# Patient Record
Sex: MALE | Race: OTHER
[De-identification: names, ages, dates, MRNs, and addresses within clinical notes are randomized per-mention and may not be internally consistent; named-entity substitution may affect disease eponyms.]

---

## 2019-03-06 ENCOUNTER — HOSPITAL ENCOUNTER (OUTPATIENT)
Dept: HOSPITAL 89 - RAD | Age: 49
End: 2019-03-06
Attending: FAMILY MEDICINE
Payer: COMMERCIAL

## 2019-03-06 DIAGNOSIS — R10.817: Primary | ICD-10-CM

## 2019-03-06 PROCEDURE — 74018 RADEX ABDOMEN 1 VIEW: CPT

## 2019-04-11 ENCOUNTER — HOSPITAL ENCOUNTER (OUTPATIENT)
Dept: HOSPITAL 89 - CT | Age: 49
End: 2019-04-11
Attending: FAMILY MEDICINE
Payer: COMMERCIAL

## 2019-04-11 DIAGNOSIS — R10.31: ICD-10-CM

## 2019-04-11 DIAGNOSIS — K57.50: Primary | ICD-10-CM

## 2019-04-11 PROCEDURE — 74177 CT ABD & PELVIS W/CONTRAST: CPT

## 2019-04-11 NOTE — RADIOLOGY IMAGING REPORT
FACILITY: [facility] 

 

PATIENT NAME: Andre Julian

: 1970

MR: [id number]

V: [id number]

EXAM DATE: 

ORDERING PHYSICIAN: EVANGELISTA JUAREZ

TECHNOLOGIST: 

 

Location: [facility]

Patient: Andre Julian

: 1970

MRN: [id number]

Visit/Account:[id number]

Date of Sevice:  2019

 

ACCESSION #: [id number]

 

CT ABDOMEN PELVIS W/ CON

 

HISTORY:  Right lower quadrant pain

 

TECHNIQUE: Following administration of IV contrast contiguous axial images acquired through the abdom
en/pelvis.  Coronal and sagittal reformatting also performed.Dose Lowering Technique

 

One of the following dose optimization techniques was utilized in the performance of this exam: Autom
ated exposure control; adjustment of the mA and/or kV according to the patient's size; or use of an i
terative  reconstruction technique.  Specific details can be referenced in the facility's radiology C
T exam operational policy.

 

CONTRAST:  75 mL Isovue-370

 

COMPARISON:  None.

 

FINDINGS:

 

Visualized lung bases:  There is a small amount scarring versus atelectasis in the inferior lingula a
nd superior left lower lobe

 

Hepatobiliary:  There is a 7 mm nonenhancing round hypoattenuating nodule inferior aspect the right l
obe likely represents a small cyst although is too small to characterize accurately

 

Spleen:  Negative.

 

Adrenals:  Negative.

 

Pancreas:  Negative.

 

Kidneys ureters or bladder: Negative.

 

Genitalia:  The seminal vesicles appears prominent and slightly heterogeneous

 

GI:  There is mild uncomplicated diverticulosis of the left-sided the colon  .  The appendix is visua
lized and does not appear inflamed

 

Vessels/spaces/nodes:  Negative.

 

Bones/soft tissues:  There are minimal spondylotic changes lower lumbar spine

 

Additional findings:  None pertinent.

 

IMPRESSION:

 

There is a small amount scarring versus atelectasis in the inferior lingula and superior left lower l
obe

 

The seminal vesicles appear prominent slightly heterogeneous

 

There is mild uncomplicated diverticulosis left-sided colon

 

The appendix is visualized does not appear inflamed

 

There is no demonstration of nephrolithiasis or hydronephrosis

 

Report Dictated By: Sparkle Kelly MD at 2019 8:43 AM

 

Report E-Signed By: Sparkle Kelly MD  at 2019 1:27 PM

 

WSN:JENNA